# Patient Record
Sex: MALE | Race: WHITE | NOT HISPANIC OR LATINO | Employment: OTHER | URBAN - METROPOLITAN AREA
[De-identification: names, ages, dates, MRNs, and addresses within clinical notes are randomized per-mention and may not be internally consistent; named-entity substitution may affect disease eponyms.]

---

## 2025-01-18 ENCOUNTER — OFFICE VISIT (OUTPATIENT)
Dept: URGENT CARE | Age: 65
End: 2025-01-18
Payer: OTHER GOVERNMENT

## 2025-01-18 VITALS
HEIGHT: 73 IN | DIASTOLIC BLOOD PRESSURE: 79 MMHG | HEART RATE: 61 BPM | SYSTOLIC BLOOD PRESSURE: 140 MMHG | BODY MASS INDEX: 28.1 KG/M2 | WEIGHT: 212 LBS | RESPIRATION RATE: 17 BRPM | TEMPERATURE: 98.4 F | OXYGEN SATURATION: 97 %

## 2025-01-18 DIAGNOSIS — U07.1 COVID: Primary | ICD-10-CM

## 2025-01-18 RX ORDER — NIRMATRELVIR AND RITONAVIR 300-100 MG
3 KIT ORAL 2 TIMES DAILY
Qty: 30 TABLET | Refills: 0 | Status: SHIPPED | OUTPATIENT
Start: 2025-01-18 | End: 2025-01-23

## 2025-01-18 ASSESSMENT — ENCOUNTER SYMPTOMS
EYES NEGATIVE: 1
COUGH: 1
CONSTITUTIONAL NEGATIVE: 1

## 2025-01-18 ASSESSMENT — PAIN SCALES - GENERAL: PAINLEVEL_OUTOF10: 0-NO PAIN

## 2025-01-18 NOTE — PROGRESS NOTES
"Subjective   Patient ID: Ron Dhaliwal III is a 65 y.o. male. They present today with a chief complaint of Other (Pt is here today because he tested positive an hour ago for COVID. Pt has a mild intermittent cough ).    History of Present Illness  Pt was exposed to covid had slight increase in cough          Past Medical History  Allergies as of 01/18/2025    (No Known Allergies)       (Not in a hospital admission)       History reviewed. No pertinent past medical history.    History reviewed. No pertinent surgical history.     reports that he has never smoked. He has never used smokeless tobacco. He reports that he does not use drugs.    Review of Systems  Review of Systems   Constitutional: Negative.    HENT: Negative.     Eyes: Negative.    Respiratory:  Positive for cough.                                   Objective    Vitals:    01/18/25 1733   BP: 140/79   BP Location: Left arm   Patient Position: Sitting   BP Cuff Size: Large adult   Pulse: 61   Resp: 17   Temp: 36.9 °C (98.4 °F)   TempSrc: Oral   SpO2: 97%   Weight: 96.2 kg (212 lb)   Height: 1.854 m (6' 1\")     No LMP for male patient.    Physical Exam  Constitutional:       Appearance: Normal appearance.   HENT:      Head: Normocephalic and atraumatic.   Cardiovascular:      Rate and Rhythm: Normal rate and regular rhythm.   Pulmonary:      Effort: Pulmonary effort is normal.      Breath sounds: Normal breath sounds.   Neurological:      Mental Status: He is alert.         Procedures    Point of Care Test & Imaging Results from this visit  No results found for this visit on 01/18/25.   No results found.    Diagnostic study results (if any) were reviewed by Nichol Rosas MD.    Assessment/Plan   Allergies, medications, history, and pertinent labs/EKGs/Imaging reviewed by Nichol Rosas MD.     Medical Decision Making  Covid will carmen with paxlovid    Orders and Diagnoses  There are no diagnoses linked to this encounter.    Medical Admin " Record      Patient disposition: Home    Electronically signed by Nichol Roass MD  5:50 PM